# Patient Record
Sex: FEMALE | Race: WHITE | NOT HISPANIC OR LATINO | Employment: FULL TIME | URBAN - METROPOLITAN AREA
[De-identification: names, ages, dates, MRNs, and addresses within clinical notes are randomized per-mention and may not be internally consistent; named-entity substitution may affect disease eponyms.]

---

## 2024-06-19 ENCOUNTER — HOSPITAL ENCOUNTER (EMERGENCY)
Facility: HOSPITAL | Age: 49
Discharge: HOME | End: 2024-06-19
Attending: EMERGENCY MEDICINE
Payer: COMMERCIAL

## 2024-06-19 VITALS
DIASTOLIC BLOOD PRESSURE: 94 MMHG | WEIGHT: 240 LBS | RESPIRATION RATE: 16 BRPM | TEMPERATURE: 98.1 F | OXYGEN SATURATION: 96 % | BODY MASS INDEX: 37.67 KG/M2 | HEIGHT: 67 IN | HEART RATE: 61 BPM | SYSTOLIC BLOOD PRESSURE: 174 MMHG

## 2024-06-19 DIAGNOSIS — N92.1 MENORRHAGIA WITH IRREGULAR CYCLE: Primary | ICD-10-CM

## 2024-06-19 LAB
B-HCG SERPL-ACNC: <2 MIU/ML
BASOPHILS # BLD AUTO: 0.03 X10*3/UL (ref 0–0.1)
BASOPHILS NFR BLD AUTO: 0.6 %
EOSINOPHIL # BLD AUTO: 0.17 X10*3/UL (ref 0–0.7)
EOSINOPHIL NFR BLD AUTO: 3.6 %
ERYTHROCYTE [DISTWIDTH] IN BLOOD BY AUTOMATED COUNT: 13.4 % (ref 11.5–14.5)
HCT VFR BLD AUTO: 39.6 % (ref 36–46)
HGB BLD-MCNC: 13.2 G/DL (ref 12–16)
IMM GRANULOCYTES # BLD AUTO: 0.02 X10*3/UL (ref 0–0.7)
IMM GRANULOCYTES NFR BLD AUTO: 0.4 % (ref 0–0.9)
LYMPHOCYTES # BLD AUTO: 1.26 X10*3/UL (ref 1.2–4.8)
LYMPHOCYTES NFR BLD AUTO: 26.9 %
MCH RBC QN AUTO: 30.2 PG (ref 26–34)
MCHC RBC AUTO-ENTMCNC: 33.3 G/DL (ref 32–36)
MCV RBC AUTO: 91 FL (ref 80–100)
MONOCYTES # BLD AUTO: 0.55 X10*3/UL (ref 0.1–1)
MONOCYTES NFR BLD AUTO: 11.8 %
NEUTROPHILS # BLD AUTO: 2.65 X10*3/UL (ref 1.2–7.7)
NEUTROPHILS NFR BLD AUTO: 56.7 %
NRBC BLD-RTO: 0 /100 WBCS (ref 0–0)
PLATELET # BLD AUTO: 189 X10*3/UL (ref 150–450)
RBC # BLD AUTO: 4.37 X10*6/UL (ref 4–5.2)
WBC # BLD AUTO: 4.7 X10*3/UL (ref 4.4–11.3)

## 2024-06-19 PROCEDURE — 99283 EMERGENCY DEPT VISIT LOW MDM: CPT

## 2024-06-19 PROCEDURE — 36415 COLL VENOUS BLD VENIPUNCTURE: CPT | Performed by: EMERGENCY MEDICINE

## 2024-06-19 PROCEDURE — 85025 COMPLETE CBC W/AUTO DIFF WBC: CPT | Performed by: EMERGENCY MEDICINE

## 2024-06-19 PROCEDURE — 84702 CHORIONIC GONADOTROPIN TEST: CPT | Performed by: EMERGENCY MEDICINE

## 2024-06-19 RX ORDER — LOSARTAN POTASSIUM 50 MG/1
1 TABLET ORAL DAILY
COMMUNITY
Start: 2020-11-16

## 2024-06-19 RX ORDER — MELOXICAM 15 MG/1
1 TABLET ORAL DAILY
COMMUNITY

## 2024-06-19 ASSESSMENT — COLUMBIA-SUICIDE SEVERITY RATING SCALE - C-SSRS
6. HAVE YOU EVER DONE ANYTHING, STARTED TO DO ANYTHING, OR PREPARED TO DO ANYTHING TO END YOUR LIFE?: NO
2. HAVE YOU ACTUALLY HAD ANY THOUGHTS OF KILLING YOURSELF?: NO
1. IN THE PAST MONTH, HAVE YOU WISHED YOU WERE DEAD OR WISHED YOU COULD GO TO SLEEP AND NOT WAKE UP?: NO

## 2024-06-19 NOTE — ED TRIAGE NOTES
Pt arrived to ED states she is on her period but bleeding more than usual. Pt states she is passing very large clots and has went through 16 pads just today. Bleeding started yesterday.

## 2024-06-19 NOTE — ED PROVIDER NOTES
HPI   Chief Complaint   Patient presents with    Vaginal Bleeding     Pt arrived to ED states she is on her period but bleeding more than usual. Pt states she is passing very large clots and has went through 16 pads just today. Bleeding started yesterday.        Patient presents with vaginal bleeding.  She started her menstrual cycle 2 days ago.  Tonight about 5 hours prior to arrival she had heavy menstruation and was passing large blood clots.  She came here today for further evaluation.  She has occasional pelvic cramps as well.                          Pedro Luis Coma Scale Score: 15                     Patient History   Past Medical History:   Diagnosis Date    Cough, unspecified 10/12/2020    Cough in adult    Excessive and frequent menstruation with irregular cycle 11/16/2020    Menorrhagia with irregular cycle    Otitis media, unspecified, bilateral 10/12/2020    Bilateral otitis media    Personal history of other diseases of the respiratory system 10/12/2020    History of sore throat    Personal history of pneumonia (recurrent)     History of pneumonia     Past Surgical History:   Procedure Laterality Date    OTHER SURGICAL HISTORY  11/16/2020    Ovarian cystectomy     No family history on file.  Social History     Tobacco Use    Smoking status: Never    Smokeless tobacco: Never   Substance Use Topics    Alcohol use: Never    Drug use: Never       Physical Exam   ED Triage Vitals [06/19/24 0119]   Temperature Heart Rate Respirations BP   36.7 °C (98.1 °F) 61 16 (!) 174/94      Pulse Ox Temp Source Heart Rate Source Patient Position   96 % Skin -- --      BP Location FiO2 (%)     -- --       Physical Exam  Vitals and nursing note reviewed.   Constitutional:       General: She is not in acute distress.     Appearance: She is well-developed.   HENT:      Head: Normocephalic and atraumatic.   Eyes:      Conjunctiva/sclera: Conjunctivae normal.   Cardiovascular:      Rate and Rhythm: Normal rate and regular rhythm.       Heart sounds: No murmur heard.  Pulmonary:      Effort: Pulmonary effort is normal. No respiratory distress.      Breath sounds: Normal breath sounds.   Abdominal:      Palpations: Abdomen is soft.      Tenderness: There is no abdominal tenderness.   Genitourinary:     Comments: No active bleeding on pelvic exam.  Small blood clot at the cervical os.  No cervical motion tenderness.  No adnexal mass or fullness.  Musculoskeletal:         General: No swelling.      Cervical back: Neck supple.   Skin:     General: Skin is warm and dry.      Capillary Refill: Capillary refill takes less than 2 seconds.   Neurological:      Mental Status: She is alert.   Psychiatric:         Mood and Affect: Mood normal.         ED Course & MDM   Diagnoses as of 06/19/24 0348   Menorrhagia with irregular cycle       Medical Decision Making  Differential diagnosis is menorrhagia, metromenorrhagia, uterine abnormality, etc.    Patient has a normal hemoglobin.  Serum hCG is negative.  She has no active bleeding the bleeding is decreased she is instructed to call her gynecologist later on today.  Prior medical records were reviewed.        Procedure  Procedures     Elio Velazquez MD  06/19/24 [id number]     10-Caden-2021 08:26

## 2024-06-26 ENCOUNTER — HOSPITAL ENCOUNTER (OUTPATIENT)
Dept: RADIOLOGY | Facility: CLINIC | Age: 49
Discharge: HOME | End: 2024-06-26
Payer: COMMERCIAL

## 2024-06-26 ENCOUNTER — TRANSCRIBE ORDERS (OUTPATIENT)
Dept: OBSTETRICS AND GYNECOLOGY | Facility: CLINIC | Age: 49
End: 2024-06-26

## 2024-06-26 ENCOUNTER — APPOINTMENT (OUTPATIENT)
Dept: OBSTETRICS AND GYNECOLOGY | Facility: CLINIC | Age: 49
End: 2024-06-26
Payer: COMMERCIAL

## 2024-06-26 VITALS
SYSTOLIC BLOOD PRESSURE: 122 MMHG | DIASTOLIC BLOOD PRESSURE: 80 MMHG | WEIGHT: 243 LBS | HEIGHT: 67 IN | BODY MASS INDEX: 38.14 KG/M2

## 2024-06-26 DIAGNOSIS — N93.8 DUB (DYSFUNCTIONAL UTERINE BLEEDING): ICD-10-CM

## 2024-06-26 LAB — POC HEMOGLOBIN: 14 G/DL (ref 12–16)

## 2024-06-26 PROCEDURE — 1036F TOBACCO NON-USER: CPT | Performed by: OBSTETRICS & GYNECOLOGY

## 2024-06-26 PROCEDURE — 85018 HEMOGLOBIN: CPT | Performed by: OBSTETRICS & GYNECOLOGY

## 2024-06-26 PROCEDURE — 99213 OFFICE O/P EST LOW 20 MIN: CPT | Performed by: OBSTETRICS & GYNECOLOGY

## 2024-06-26 RX ORDER — NORETHINDRONE ACETATE AND ETHINYL ESTRADIOL 1.5-30(21)
1 KIT ORAL DAILY
Qty: 28 TABLET | Refills: 2 | Status: SHIPPED | OUTPATIENT
Start: 2024-06-26

## 2024-06-26 RX ORDER — LOSARTAN POTASSIUM AND HYDROCHLOROTHIAZIDE 25; 100 MG/1; MG/1
1 TABLET ORAL
COMMUNITY
Start: 2024-06-14

## 2024-06-26 ASSESSMENT — PAIN SCALES - GENERAL: PAINLEVEL: 2

## 2024-06-26 NOTE — PROGRESS NOTES
Subjective   Patient ID: Sandra Fortune is a 49 y.o. female who presents for Vaginal Bleeding (PT is here for Dub.  PT was in [facility] last Tuesday for very heavy bleeding and passing clots.  PT said she is Perimenopausal she has only had her period three times this year.).  HPI  Patient denies lightheadedness shortness of breath or symptoms of acute or chronic blood loss.  Hemoglobin level in the office today is 14.0  Review of Systems  10 systems have been reviewed and are negative and noncontributory to the patient current ailments.    Objective   Physical Exam  Vital signs reviewed    ABDOMEN- soft, non distended, bowel sound normal pitch and intensity,no palpable abnormal masses  GENITOURINARY- External genitalia: Normal.                                 - Uterus: AV/AF NSSC, mobile and nontender                                -The adnexal areas are free of tenderness or mass                                -There are no cervical lesions; there is no cervical motion tenderness                                -Vagina without lesions.  Assessment/Plan   Diagnoses and all orders for this visit:  DUB (dysfunctional uterine bleeding)  -     POCT hemoglobin manually resulted  -     US pelvis transvaginal; Future  -     norethindrone-e.estradioL-iron (Microgestin FE 1.5/30) 1.5 mg-30 mcg (21)/75 mg (7) tablet; Take 1 tablet by mouth once daily.  -.We discussed treatment options for dysfunctional uterine bleeding.  Options to include cyclic hormonal therapy, ablation, surgical intervention including D&C and hysteroscopy and ultimately hysterectomy.  For now we will cycle 3 months with a combination birth control pill and follow clinically.  Patient will return to the office if symptoms persist.         Tang Rodriguez DO 06/26/24 9:54 AM

## 2024-10-30 ENCOUNTER — APPOINTMENT (OUTPATIENT)
Dept: OBSTETRICS AND GYNECOLOGY | Facility: CLINIC | Age: 49
End: 2024-10-30
Payer: COMMERCIAL

## 2024-10-30 ENCOUNTER — HOSPITAL ENCOUNTER (OUTPATIENT)
Dept: RADIOLOGY | Facility: CLINIC | Age: 49
Discharge: HOME | End: 2024-10-30
Payer: COMMERCIAL

## 2024-10-30 VITALS
WEIGHT: 246 LBS | HEIGHT: 67 IN | SYSTOLIC BLOOD PRESSURE: 128 MMHG | BODY MASS INDEX: 38.61 KG/M2 | DIASTOLIC BLOOD PRESSURE: 82 MMHG

## 2024-10-30 DIAGNOSIS — Z86.018 HISTORY OF UTERINE FIBROID: ICD-10-CM

## 2024-10-30 DIAGNOSIS — Z12.31 BREAST CANCER SCREENING BY MAMMOGRAM: ICD-10-CM

## 2024-10-30 DIAGNOSIS — Z86.018 HISTORY OF UTERINE FIBROID: Primary | ICD-10-CM

## 2024-10-30 DIAGNOSIS — Z01.419 WELL WOMAN EXAM WITH ROUTINE GYNECOLOGICAL EXAM: ICD-10-CM

## 2024-10-30 PROCEDURE — 3008F BODY MASS INDEX DOCD: CPT | Performed by: OBSTETRICS & GYNECOLOGY

## 2024-10-30 PROCEDURE — 99396 PREV VISIT EST AGE 40-64: CPT | Performed by: OBSTETRICS & GYNECOLOGY

## 2024-10-30 PROCEDURE — 1036F TOBACCO NON-USER: CPT | Performed by: OBSTETRICS & GYNECOLOGY

## 2024-10-30 PROCEDURE — 76857 US EXAM PELVIC LIMITED: CPT | Performed by: OBSTETRICS & GYNECOLOGY

## 2024-10-30 ASSESSMENT — PAIN SCALES - GENERAL: PAINLEVEL_OUTOF10: 0-NO PAIN

## 2024-11-18 LAB
CYTOLOGY CMNT CVX/VAG CYTO-IMP: NORMAL
HPV HR 12 DNA GENITAL QL NAA+PROBE: NEGATIVE
HPV HR GENOTYPES PNL CVX NAA+PROBE: NEGATIVE
HPV16 DNA SPEC QL NAA+PROBE: NEGATIVE
HPV18 DNA SPEC QL NAA+PROBE: NEGATIVE
LAB AP HPV GENOTYPE QUESTION: YES
LAB AP HPV HR: NORMAL
LABORATORY COMMENT REPORT: NORMAL
LMP START DATE: NORMAL
MENSTRUAL HX REPORTED: NORMAL
PATH REPORT.TOTAL CANCER: NORMAL

## 2025-04-30 ENCOUNTER — APPOINTMENT (OUTPATIENT)
Dept: OBSTETRICS AND GYNECOLOGY | Facility: CLINIC | Age: 50
End: 2025-04-30
Payer: COMMERCIAL

## 2025-04-30 VITALS
HEIGHT: 67 IN | SYSTOLIC BLOOD PRESSURE: 126 MMHG | BODY MASS INDEX: 39.24 KG/M2 | DIASTOLIC BLOOD PRESSURE: 74 MMHG | WEIGHT: 250 LBS

## 2025-04-30 DIAGNOSIS — N93.8 DUB (DYSFUNCTIONAL UTERINE BLEEDING): ICD-10-CM

## 2025-04-30 PROCEDURE — 1036F TOBACCO NON-USER: CPT | Performed by: OBSTETRICS & GYNECOLOGY

## 2025-04-30 PROCEDURE — 3008F BODY MASS INDEX DOCD: CPT | Performed by: OBSTETRICS & GYNECOLOGY

## 2025-04-30 PROCEDURE — 99213 OFFICE O/P EST LOW 20 MIN: CPT | Performed by: OBSTETRICS & GYNECOLOGY

## 2025-04-30 RX ORDER — LOSARTAN POTASSIUM 100 MG/1
TABLET ORAL
COMMUNITY
Start: 2025-04-29

## 2025-04-30 RX ORDER — TRANEXAMIC ACID 650 MG/1
TABLET ORAL
Qty: 30 TABLET | Refills: 1 | Status: SHIPPED | OUTPATIENT
Start: 2025-04-30

## 2025-04-30 RX ORDER — HYDROCHLOROTHIAZIDE 25 MG/1
TABLET ORAL
COMMUNITY
Start: 2025-04-29

## 2025-04-30 ASSESSMENT — PAIN SCALES - GENERAL: PAINLEVEL_OUTOF10: 0-NO PAIN

## 2025-04-30 NOTE — PROGRESS NOTES
Subjective   Patient ID: Sandra Fortune is a 49 y.o. female who presents for Follow-up (C/o  periods still irregular/- February and April (this month heavy 1st day)/- some pain//Chaperone declined).  HPI  As contained in the chief complaint  US pelvis  Status: Final result     PACS Images     Show images for US pelvis  Signed by    Signed Time Phone Pager   Tang Rodriguez DO 10/31/2024 14:59 [phone]      Exam Information    Status Exam Begun Exam Ended   Final 10/30/2024 09:57 10/30/2024 10:08     Study Result    Narrative & Impression   Interpreted by: Tang Rodriguez  Indication  ========     Fibroid uterus  Method  ======     Transabdominal ultrasound examination,  Uterus  ======     Uterus:Leiomyoma  Uterus position:anteverted  Description of uterine malformations:none  Myometrium:normal  Endometrium:normal  Cervix details:normal  Uterus oewpcn701.7 mm  Uterus width78.6 mm  Uterus qmjmud95.7 mm  Endometrial thickness, total3.5 mm  Fibroids:Fibroids identified  Uterine fibroid D136 mm  Uterine fibroid D234 mm  Uterine fibroid D345 mm  Uterine fibroid mean38.5 mm  Uterine fibroid vol29.218 cmï¿½  Uterine fibroids findings:Anterior  Uterine fibroid D158 mm  Uterine fibroid D246 mm  Uterine fibroid D356 mm  Uterine fibroid mean53.3 mm  Uterine fibroid vol78.230 cmï¿½  Uterine fibroids findings:posterior right  Right Ovary  =========     Rt ovary:Not visualized  Left Ovary  ========     Lt ovary:Not visualized  Cul de Sac  =========     Visualized. No free fluid visualized  Impression  =========     Fibroid uterus with one lcoated in the anterior wall measuring 3.8 cms.  There is a second density located posterior and to the right which moves with the uterus during scanning. Possible pedunculated fibroid measuring 5.1cms.  Ovaries were again not visualized on today's study  Normal blood flow visualized within the adnexa  No free fluid seen in the pelvis. .       Review of Systems  10 systems have been  reviewed and are negative and noncontributory to the patient current ailments.    Objective   Physical Exam  Vital signs reviewed      Gastrointestinal: Soft, non distended, bowel sound normal pitch and intensity,no palpable abnormal masses  Genitourinary:  External genitalia: Normal.                                 - Uterus: AV/AF NSSC, mobile and nontender slightly enlarged                                -The adnexal areas are free of tenderness or mass                                                           -Inspection of Perianal Area: Normal    Assessment/Plan   Diagnoses and all orders for this visit:  DUB (dysfunctional uterine bleeding)  -     tranexamic acid (Lysteda) 650 mg tablet; 1 or 2 tablets every 8 hours for first 24 to 48 hours of period.  Heavy bleeding  - Also we discussed progesterone therapy, hysterectomy, endometrial ablation.  - Return to the office 6 months         Tang Rodriguez DO 04/30/25 10:35 AM

## 2025-05-06 ENCOUNTER — APPOINTMENT (OUTPATIENT)
Dept: RADIOLOGY | Facility: CLINIC | Age: 50
End: 2025-05-06
Payer: COMMERCIAL

## 2025-05-06 VITALS — BODY MASS INDEX: 40.18 KG/M2 | HEIGHT: 66 IN | WEIGHT: 250 LBS

## 2025-05-06 DIAGNOSIS — Z12.31 BREAST CANCER SCREENING BY MAMMOGRAM: ICD-10-CM

## 2025-05-06 PROCEDURE — 77067 SCR MAMMO BI INCL CAD: CPT

## 2025-05-06 PROCEDURE — 77067 SCR MAMMO BI INCL CAD: CPT | Mod: BILATERAL PROCEDURE | Performed by: RADIOLOGY

## 2025-05-06 PROCEDURE — 77063 BREAST TOMOSYNTHESIS BI: CPT | Mod: BILATERAL PROCEDURE | Performed by: RADIOLOGY

## 2025-05-07 DIAGNOSIS — N64.89 BREAST ASYMMETRY: Primary | ICD-10-CM

## 2025-05-07 PROBLEM — N75.1 BARTHOLIN'S GLAND ABSCESS: Status: ACTIVE | Noted: 2025-05-07

## 2025-05-07 PROBLEM — L73.9 FOLLICULITIS: Status: ACTIVE | Noted: 2025-05-07

## 2025-05-07 PROBLEM — J30.2 SEASONAL ALLERGIES: Status: ACTIVE | Noted: 2025-05-07

## 2025-05-08 ENCOUNTER — HOSPITAL ENCOUNTER (OUTPATIENT)
Dept: RADIOLOGY | Facility: CLINIC | Age: 50
Discharge: HOME | End: 2025-05-08
Payer: COMMERCIAL

## 2025-05-08 DIAGNOSIS — N63.20 MASS OF LEFT BREAST, UNSPECIFIED QUADRANT: Primary | ICD-10-CM

## 2025-05-08 DIAGNOSIS — N64.89 BREAST ASYMMETRY: ICD-10-CM

## 2025-05-08 PROCEDURE — 76982 USE 1ST TARGET LESION: CPT

## 2025-05-08 PROCEDURE — 77061 BREAST TOMOSYNTHESIS UNI: CPT | Mod: LT

## 2025-05-08 PROCEDURE — 76642 ULTRASOUND BREAST LIMITED: CPT | Mod: LT

## 2025-05-29 ENCOUNTER — OFFICE VISIT (OUTPATIENT)
Dept: ORTHOPEDIC SURGERY | Facility: CLINIC | Age: 50
End: 2025-05-29
Payer: COMMERCIAL

## 2025-05-29 ENCOUNTER — HOSPITAL ENCOUNTER (OUTPATIENT)
Dept: RADIOLOGY | Facility: CLINIC | Age: 50
Discharge: HOME | End: 2025-05-29
Payer: COMMERCIAL

## 2025-05-29 VITALS — HEIGHT: 66 IN | WEIGHT: 240 LBS | BODY MASS INDEX: 38.57 KG/M2

## 2025-05-29 DIAGNOSIS — M25.562 LEFT KNEE PAIN, UNSPECIFIED CHRONICITY: ICD-10-CM

## 2025-05-29 DIAGNOSIS — S83.242A ACUTE MEDIAL MENISCUS TEAR, LEFT, INITIAL ENCOUNTER: ICD-10-CM

## 2025-05-29 PROCEDURE — 99204 OFFICE O/P NEW MOD 45 MIN: CPT | Performed by: ORTHOPAEDIC SURGERY

## 2025-05-29 PROCEDURE — 73562 X-RAY EXAM OF KNEE 3: CPT | Mod: LEFT SIDE | Performed by: RADIOLOGY

## 2025-05-29 PROCEDURE — 1036F TOBACCO NON-USER: CPT | Performed by: ORTHOPAEDIC SURGERY

## 2025-05-29 PROCEDURE — 73562 X-RAY EXAM OF KNEE 3: CPT | Mod: LT

## 2025-05-29 PROCEDURE — 20610 DRAIN/INJ JOINT/BURSA W/O US: CPT | Performed by: ORTHOPAEDIC SURGERY

## 2025-05-29 PROCEDURE — 3008F BODY MASS INDEX DOCD: CPT | Performed by: ORTHOPAEDIC SURGERY

## 2025-05-29 RX ORDER — LIDOCAINE HYDROCHLORIDE 20 MG/ML
2 INJECTION, SOLUTION INFILTRATION; PERINEURAL
Status: COMPLETED | OUTPATIENT
Start: 2025-05-29 | End: 2025-05-29

## 2025-05-29 RX ORDER — METHYLPREDNISOLONE ACETATE 40 MG/ML
40 INJECTION, SUSPENSION INTRA-ARTICULAR; INTRALESIONAL; INTRAMUSCULAR; SOFT TISSUE
Status: COMPLETED | OUTPATIENT
Start: 2025-05-29 | End: 2025-05-29

## 2025-05-29 RX ADMIN — LIDOCAINE HYDROCHLORIDE 2 ML: 20 INJECTION, SOLUTION INFILTRATION; PERINEURAL at 16:04

## 2025-05-29 RX ADMIN — METHYLPREDNISOLONE ACETATE 40 MG: 40 INJECTION, SUSPENSION INTRA-ARTICULAR; INTRALESIONAL; INTRAMUSCULAR; SOFT TISSUE at 16:04

## 2025-05-29 ASSESSMENT — PAIN SCALES - GENERAL: PAINLEVEL_OUTOF10: 9

## 2025-05-29 ASSESSMENT — PAIN - FUNCTIONAL ASSESSMENT: PAIN_FUNCTIONAL_ASSESSMENT: 0-10

## 2025-05-29 NOTE — PROGRESS NOTES
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 activity modification and home exercise program directed by her primary care physician  No prior knee problems    See intake sheet which was reviewed and scanned in the chart    Constitutional: Well-developed well-nourished   Eyes: Sclerae anicteric, pupils equal and round  HENT: Normocephalic atraumatic  Cardiovascular: Pulses full, regular rate and rhythm  Respiratory: Breathing not labored, no wheezing  Integumentary: Skin intact, no lesions or rashes  Neurological: Sensation intact, no gross strength deficits, reflexes equal  Psychiatric: Alert oriented and appropriate  Hematologic/lymphatic: No lymphadenopathy  Left knee: No angular deformity small effusion tender medial joint line positive Morena's full range of motion no instability      X-rays are negative    Impression medial meniscus tear  Injected knee today as she is having serious discomfort at nighttime recommend MRI  Discussed arthroscopic intervention  All questions answered  L Inj/Asp: L knee on 5/29/2025 4:04 PM  Indications: pain  Details: 21 G needle, anteromedial approach  Medications: 40 mg methylPREDNISolone acetate 40 mg/mL; 2 mL lidocaine 20 mg/mL (2 %)

## 2025-06-14 ENCOUNTER — HOSPITAL ENCOUNTER (OUTPATIENT)
Dept: RADIOLOGY | Facility: HOSPITAL | Age: 50
Discharge: HOME | End: 2025-06-14
Payer: COMMERCIAL

## 2025-06-14 DIAGNOSIS — S83.242A ACUTE MEDIAL MENISCUS TEAR, LEFT, INITIAL ENCOUNTER: ICD-10-CM

## 2025-06-14 PROCEDURE — 73721 MRI JNT OF LWR EXTRE W/O DYE: CPT | Mod: LEFT SIDE | Performed by: RADIOLOGY

## 2025-06-14 PROCEDURE — 73721 MRI JNT OF LWR EXTRE W/O DYE: CPT | Mod: LT

## 2025-06-17 ENCOUNTER — TELEPHONE (OUTPATIENT)
Dept: ORTHOPEDIC SURGERY | Facility: CLINIC | Age: 50
End: 2025-06-17
Payer: COMMERCIAL

## 2025-06-17 NOTE — TELEPHONE ENCOUNTER
Patient of Dr. Ledesma had MRI done 6/14/25 and would like to know if you are able to go over her results with her and her daughter Holly.

## 2025-07-01 ENCOUNTER — APPOINTMENT (OUTPATIENT)
Dept: ORTHOPEDIC SURGERY | Facility: CLINIC | Age: 50
End: 2025-07-01
Payer: COMMERCIAL

## 2025-07-01 VITALS — WEIGHT: 240 LBS | BODY MASS INDEX: 38.74 KG/M2

## 2025-07-01 DIAGNOSIS — M23.322 MEDIAL MENISCUS, POSTERIOR HORN DERANGEMENT, LEFT: Primary | ICD-10-CM

## 2025-07-01 PROCEDURE — 99214 OFFICE O/P EST MOD 30 MIN: CPT | Performed by: ORTHOPAEDIC SURGERY

## 2025-07-01 NOTE — PROGRESS NOTES
Here discussed MRI results which show a torn medial meniscus she still having symptoms she is still limping and discomfort  Past medical,family and social histories have been reviewed and are up to date.  All other body systems have been reviewed and are negative for complaint.  Constitutional: Well-developed well-nourished   Eyes: Sclerae anicteric, pupils equal and round  HENT: Normocephalic atraumatic  Cardiovascular: Pulses full, regular rate and rhythm  Respiratory: Breathing not labored, no wheezing  Integumentary: Skin intact, no lesions or rashes  Neurological: Sensation intact, no gross strength deficits, reflexes equal  Psychiatric: Alert oriented and appropriate  Hematologic/lymphatic: No lymphadenopathy  Left knee: Small effusion range of motion  tender medially positive Morena's no instability  MRI is reviewed with patient shows a torn medial meniscus minor degenerative change assessment symptomatic medial meniscus tear has failed tincture of time nonoperative therapy recommend arthroscopic meniscectomy  Risks, benefits, goals of surgery as well as alternative treatments were discussed.   Realistic post operative expectations and expected course of recovery were reviewed.  All questions answered.

## 2025-07-09 ENCOUNTER — TELEPHONE (OUTPATIENT)
Dept: ORTHOPEDIC SURGERY | Facility: CLINIC | Age: 50
End: 2025-07-09
Payer: COMMERCIAL

## 2025-07-09 PROBLEM — M23.322 MEDIAL MENISCUS, POSTERIOR HORN DERANGEMENT, LEFT: Status: ACTIVE | Noted: 2025-07-01

## 2025-07-14 ENCOUNTER — CLINICAL SUPPORT (OUTPATIENT)
Dept: PREADMISSION TESTING | Facility: HOSPITAL | Age: 50
End: 2025-07-14
Payer: COMMERCIAL

## 2025-07-14 NOTE — CPM/PAT NURSE NOTE
CPM/PAT Nurse Note      Name: Sandra Fortune (Sandra Fortune)  /Age: 1975/50 y.o.       Medical History[1]    Surgical History[2]    Patient  reports being sexually active and has had partner(s) who are male. She reports using the following method of birth control/protection: None.    Family History[3]    Allergies[4]    Prior to Admission medications    Medication Sig Start Date End Date Taking? Authorizing Provider   hydroCHLOROthiazide (HYDRODiuril) 25 mg tablet  25   Historical Provider, MD   losartan (Cozaar) 100 mg tablet  25   Historical Provider, MD   meloxicam (Mobic) 15 mg tablet Take 1 tablet (15 mg) by mouth once daily.    Historical Provider, MD   tranexamic acid (Lysteda) 650 mg tablet 1 or 2 tablets every 8 hours for first 24 to 48 hours of period.  Heavy bleeding  Patient not taking: Reported on 2025   Tang Rodriguez,    losartan-hydrochlorothiazide (Hyzaar) 100-25 mg tablet Take 1 tablet by mouth early in the morning.. 24  Historical Provider, MD MEDHAT LANDIS     DASI Risk Score    No data to display       Caprini DVT Assessment    No data to display       Modified Frailty Index    No data to display       [id number] Stroke Risk Points  Current as of just now        N/A 0 to 9 Points:      Last Change: N/A          The [id number]-[id number] risk score (Lip DEYSI, et al. 2009. © 2010 American College [employer]) quantifies the risk of stroke for a patient with atrial fibrillation. For patients without atrial fibrillation or under the age of 18 this score appears as N/A. Higher score values generally indicate higher risk of stroke.        This score is not applicable to this patient. Components are not calculated.          Revised Cardiac Risk Index    No data to display       Apfel Simplified Score    No data to display       Risk Analysis Index Results This Encounter    No data found in the last 10 encounters.       Prodigy: High Risk  Total  Score: 0          ARISCAT Score for Postoperative Pulmonary Complications    No data to display       Law Perioperative Risk for Myocardial Infarction or Cardiac Arrest (PRESTON)    No data to display         Nurse Plan of Action: RN screening call complete.  Reviewed allergies, medications and pharmacy, medical, surgical and social history with patient.  Chart updated.  Instructed patient to stop Mobic (today) prior to surgery.                  [1]   Past Medical History:  Diagnosis Date    Arthritis     Hypertension     Obesity     Ovarian cyst     PONV (postoperative nausea and vomiting)    [2]   Past Surgical History:  Procedure Laterality Date    BREAST SURGERY Left 2009    EXCISION CYST / MASS BREAST WITH FROZEN SECTION    OVARIAN CYST REMOVAL     [3]   Family History  Problem Relation Name Age of Onset    Diabetes Mother      No Known Problems Father     [4] No Known Allergies

## 2025-07-15 ENCOUNTER — APPOINTMENT (OUTPATIENT)
Dept: LAB | Facility: HOSPITAL | Age: 50
End: 2025-07-15
Payer: COMMERCIAL

## 2025-07-15 ENCOUNTER — PRE-ADMISSION TESTING (OUTPATIENT)
Dept: PREADMISSION TESTING | Facility: HOSPITAL | Age: 50
End: 2025-07-15
Payer: COMMERCIAL

## 2025-07-15 ENCOUNTER — DOCUMENTATION (OUTPATIENT)
Dept: PREADMISSION TESTING | Facility: HOSPITAL | Age: 50
End: 2025-07-15

## 2025-07-15 VITALS
RESPIRATION RATE: 16 BRPM | HEART RATE: 63 BPM | BODY MASS INDEX: 39.68 KG/M2 | WEIGHT: 246.91 LBS | SYSTOLIC BLOOD PRESSURE: 152 MMHG | OXYGEN SATURATION: 98 % | HEIGHT: 66 IN | DIASTOLIC BLOOD PRESSURE: 80 MMHG | TEMPERATURE: 98.4 F

## 2025-07-15 DIAGNOSIS — I10 ESSENTIAL (PRIMARY) HYPERTENSION: Primary | ICD-10-CM

## 2025-07-15 DIAGNOSIS — I10 PRIMARY HYPERTENSION: Primary | ICD-10-CM

## 2025-07-15 LAB
ANION GAP SERPL CALC-SCNC: 13 MMOL/L (ref 10–20)
ATRIAL RATE: 58 BPM
BASOPHILS # BLD AUTO: 0.02 X10*3/UL (ref 0–0.1)
BASOPHILS NFR BLD AUTO: 0.4 %
BUN SERPL-MCNC: 14 MG/DL (ref 6–23)
CALCIUM SERPL-MCNC: 8.6 MG/DL (ref 8.6–10.3)
CHLORIDE SERPL-SCNC: 106 MMOL/L (ref 98–107)
CO2 SERPL-SCNC: 23 MMOL/L (ref 21–32)
CREAT SERPL-MCNC: 0.51 MG/DL (ref 0.5–1.05)
EGFRCR SERPLBLD CKD-EPI 2021: >90 ML/MIN/1.73M*2
EOSINOPHIL # BLD AUTO: 0.11 X10*3/UL (ref 0–0.7)
EOSINOPHIL NFR BLD AUTO: 2.4 %
ERYTHROCYTE [DISTWIDTH] IN BLOOD BY AUTOMATED COUNT: 12.8 % (ref 11.5–14.5)
GLUCOSE SERPL-MCNC: 88 MG/DL (ref 74–99)
HCT VFR BLD AUTO: 38.9 % (ref 36–46)
HGB BLD-MCNC: 12.8 G/DL (ref 12–16)
IMM GRANULOCYTES # BLD AUTO: 0.04 X10*3/UL (ref 0–0.7)
IMM GRANULOCYTES NFR BLD AUTO: 0.9 % (ref 0–0.9)
LYMPHOCYTES # BLD AUTO: 1.03 X10*3/UL (ref 1.2–4.8)
LYMPHOCYTES NFR BLD AUTO: 22.4 %
MCH RBC QN AUTO: 29.3 PG (ref 26–34)
MCHC RBC AUTO-ENTMCNC: 32.9 G/DL (ref 32–36)
MCV RBC AUTO: 89 FL (ref 80–100)
MONOCYTES # BLD AUTO: 0.52 X10*3/UL (ref 0.1–1)
MONOCYTES NFR BLD AUTO: 11.3 %
NEUTROPHILS # BLD AUTO: 2.88 X10*3/UL (ref 1.2–7.7)
NEUTROPHILS NFR BLD AUTO: 62.6 %
NRBC BLD-RTO: 0 /100 WBCS (ref 0–0)
P AXIS: 55 DEGREES
P OFFSET: 189 MS
P ONSET: 132 MS
PLATELET # BLD AUTO: 205 X10*3/UL (ref 150–450)
POTASSIUM SERPL-SCNC: 4.1 MMOL/L (ref 3.5–5.3)
PR INTERVAL: 170 MS
Q ONSET: 217 MS
QRS COUNT: 9 BEATS
QRS DURATION: 88 MS
QT INTERVAL: 430 MS
QTC CALCULATION(BAZETT): 422 MS
QTC FREDERICIA: 424 MS
R AXIS: 16 DEGREES
RBC # BLD AUTO: 4.37 X10*6/UL (ref 4–5.2)
SODIUM SERPL-SCNC: 138 MMOL/L (ref 136–145)
T AXIS: 34 DEGREES
T OFFSET: 432 MS
VENTRICULAR RATE: 58 BPM
WBC # BLD AUTO: 4.6 X10*3/UL (ref 4.4–11.3)

## 2025-07-15 PROCEDURE — 80048 BASIC METABOLIC PNL TOTAL CA: CPT

## 2025-07-15 PROCEDURE — 93010 ELECTROCARDIOGRAM REPORT: CPT | Performed by: INTERNAL MEDICINE

## 2025-07-15 PROCEDURE — 85025 COMPLETE CBC W/AUTO DIFF WBC: CPT

## 2025-07-15 PROCEDURE — 93005 ELECTROCARDIOGRAM TRACING: CPT | Performed by: NURSE PRACTITIONER

## 2025-07-15 ASSESSMENT — ENCOUNTER SYMPTOMS
NECK NEGATIVE: 1
ENDOCRINE NEGATIVE: 1
ARTHRALGIAS: 1
CONSTITUTIONAL NEGATIVE: 1
RESPIRATORY NEGATIVE: 1
LIMITED RANGE OF MOTION: 1
CARDIOVASCULAR NEGATIVE: 1
NEUROLOGICAL NEGATIVE: 1
GASTROINTESTINAL NEGATIVE: 1

## 2025-07-15 NOTE — CPM/PAT NURSE NOTE
CPM/PAT Nurse Note      Name: Sandra Fortune (Sandra Fortune)  /Age: 1975/50 y.o.       Medical History[1]    Surgical History[2]    Patient  reports being sexually active and has had partner(s) who are male. She reports using the following method of birth control/protection: None.    Family History[3]    Allergies[4]    Prior to Admission medications    Medication Sig Start Date End Date Taking? Authorizing Provider   hydroCHLOROthiazide (HYDRODiuril) 25 mg tablet  25   Historical Provider, MD   losartan (Cozaar) 100 mg tablet  25   Historical Provider, MD   meloxicam (Mobic) 15 mg tablet Take 1 tablet (15 mg) by mouth once daily.    Historical Provider, MD   tranexamic acid (Lysteda) 650 mg tablet 1 or 2 tablets every 8 hours for first 24 to 48 hours of period.  Heavy bleeding  Patient not taking: Reported on 7/15/2025 4/30/25   Tang Rodriguez,    losartan-hydrochlorothiazide (Hyzaar) 100-25 mg tablet Take 1 tablet by mouth early in the morning.. 24  Historical Provider, MD MEDHAT LANDIS     DASI Risk Score    No data to display       Caprini DVT Assessment    No data to display       Modified Frailty Index    No data to display       [id number] Stroke Risk Points  Current as of just now        N/A 0 to 9 Points:      Last Change: N/A          The [id number] risk score (Lip DEYSI, et al. 2009. © 2010 American College [employer] Chest [employer]) quantifies the risk of stroke for a patient with atrial fibrillation. For patients without atrial fibrillation or under the age of 18 this score appears as N/A. Higher score values generally indicate higher risk of stroke.        This score is not applicable to this patient. Components are not calculated.          Revised Cardiac Risk Index    No data to display       Apfel Simplified Score    No data to display       Risk Analysis Index Results This Encounter    No data found in the last 10 encounters.       Prodigy: High Risk  Total  Score: 0          ARISCAT Score for Postoperative Pulmonary Complications    No data to display       Law Perioperative Risk for Myocardial Infarction or Cardiac Arrest (PRESTON)    No data to display         Nurse Plan of Action: After Visit Summary (AVS) reviewed and patient verbalized good understanding of medications and NPO instructions.                   [1]   Past Medical History:  Diagnosis Date    Arthritis     Hypertension     Obesity     Ovarian cyst     PONV (postoperative nausea and vomiting)    [2]   Past Surgical History:  Procedure Laterality Date    BREAST SURGERY Left 2009    EXCISION CYST / MASS BREAST WITH FROZEN SECTION    OVARIAN CYST REMOVAL     [3]   Family History  Problem Relation Name Age of Onset    Diabetes Mother      No Known Problems Father     [4] No Known Allergies

## 2025-07-15 NOTE — CPM/PAT NURSE NOTE
CPM/PAT Nurse Note      Name: Snadra Fortune (Sandra Fortune)  /Age: 1975/50 y.o.       Medical History[1]    Surgical History[2]    Patient  reports being sexually active and has had partner(s) who are male. She reports using the following method of birth control/protection: None.    Family History[3]    Allergies[4]    Prior to Admission medications    Medication Sig Start Date End Date Taking? Authorizing Provider   hydroCHLOROthiazide (HYDRODiuril) 25 mg tablet  25   Historical Provider, MD   losartan (Cozaar) 100 mg tablet  25   Historical Provider, MD   meloxicam (Mobic) 15 mg tablet Take 1 tablet (15 mg) by mouth once daily.    Historical Provider, MD   tranexamic acid (Lysteda) 650 mg tablet 1 or 2 tablets every 8 hours for first 24 to 48 hours of period.  Heavy bleeding  Patient not taking: Reported on 7/15/2025 4/30/25   Tang Rodriguez,    losartan-hydrochlorothiazide (Hyzaar) 100-25 mg tablet Take 1 tablet by mouth early in the morning.. 24  Historical Provider, MD MEDHAT LANDIS     DASI Risk Score    No data to display       Caprini DVT Assessment    No data to display       Modified Frailty Index    No data to display       [id number] Stroke Risk Points  Current as of a minute ago        N/A 0 to 9 Points:      Last Change: N/A          The [id number] risk score (Lip DEYSI, et al. 2009. © 2010 American College [employer] Chest [employer]icians) quantifies the risk of stroke for a patient with atrial fibrillation. For patients without atrial fibrillation or under the age of 18 this score appears as N/A. Higher score values generally indicate higher risk of stroke.        This score is not applicable to this patient. Components are not calculated.          Revised Cardiac Risk Index    No data to display       Apfel Simplified Score    No data to display       Risk Analysis Index Results This Encounter    No data found in the last 10 encounters.       Prodigy: High Risk  Total  Score: 0          ARISCAT Score for Postoperative Pulmonary Complications    No data to display       Law Perioperative Risk for Myocardial Infarction or Cardiac Arrest (PRESTON)    No data to display         Nurse Plan of Action: After Visit Summary (AVS) reviewed and patient verbalized good understanding of medications and NPO instructions.                   [1]   Past Medical History:  Diagnosis Date    Arthritis     Hypertension     Obesity     Ovarian cyst     PONV (postoperative nausea and vomiting)    [2]   Past Surgical History:  Procedure Laterality Date    BREAST SURGERY Left 2009    EXCISION CYST / MASS BREAST WITH FROZEN SECTION    OVARIAN CYST REMOVAL     [3]   Family History  Problem Relation Name Age of Onset    Diabetes Mother      No Known Problems Father     [4] No Known Allergies

## 2025-07-15 NOTE — CPM/PAT NURSE NOTE
CPM/PAT Nurse Note      Name: Sandra Fortune (Sandra Fortune)  /Age: 1975/50 y.o.       Medical History[1]    Surgical History[2]    Patient  reports being sexually active and has had partner(s) who are male. She reports using the following method of birth control/protection: None.    Family History[3]    Allergies[4]    Prior to Admission medications    Medication Sig Start Date End Date Taking? Authorizing Provider   hydroCHLOROthiazide (HYDRODiuril) 25 mg tablet  25  Yes Historical Provider, MD   losartan (Cozaar) 100 mg tablet  25  Yes Historical Provider, MD   meloxicam (Mobic) 15 mg tablet Take 1 tablet (15 mg) by mouth once daily.   Yes Historical Provider, MD   tranexamic acid (Lysteda) 650 mg tablet 1 or 2 tablets every 8 hours for first 24 to 48 hours of period.  Heavy bleeding  Patient not taking: Reported on 7/15/2025 4/30/25   Tang Rodriguez,    losartan-hydrochlorothiazide (Hyzaar) 100-25 mg tablet Take 1 tablet by mouth early in the morning.. 24  Historical Provider, MD MEDHAT LANDIS     DASI Risk Score    No data to display       Caprini DVT Assessment    No data to display       Modified Frailty Index    No data to display       [id number] Stroke Risk Points  Current as of just now        N/A 0 to 9 Points:      Last Change: N/A          The [id number] risk score (Lip DEYSI, et al. 2009. © 2010 American College [employer]) quantifies the risk of stroke for a patient with atrial fibrillation. For patients without atrial fibrillation or under the age of 18 this score appears as N/A. Higher score values generally indicate higher risk of stroke.        This score is not applicable to this patient. Components are not calculated.          Revised Cardiac Risk Index    No data to display       Apfel Simplified Score    No data to display       Risk Analysis Index Results This Encounter    No data found in the last 10 encounters.       Prodigy: High Risk   Total Score: 0          ARISCAT Score for Postoperative Pulmonary Complications    No data to display       Law Perioperative Risk for Myocardial Infarction or Cardiac Arrest (PRESTON)    No data to display         Nurse Plan of Action:       After Visit Summary (AVS) reviewed and patient verbalized good understanding of medications and NPO instructions.            [1]   Past Medical History:  Diagnosis Date    Arthritis     Hypertension     Obesity     Ovarian cyst     PONV (postoperative nausea and vomiting)    [2]   Past Surgical History:  Procedure Laterality Date    BREAST SURGERY Left 2009    EXCISION CYST / MASS BREAST WITH FROZEN SECTION    OVARIAN CYST REMOVAL     [3]   Family History  Problem Relation Name Age of Onset    Diabetes Mother      No Known Problems Father     [4] No Known Allergies

## 2025-07-15 NOTE — PREPROCEDURE INSTRUCTIONS
Medication List            Accurate as of July 15, 2025  8:51 AM. Always use your most recent med list.                hydroCHLOROthiazide 25 mg tablet  Commonly known as: HYDRODiuril  Medication Adjustments for Surgery: Take/Use as prescribed     losartan 100 mg tablet  Commonly known as: Cozaar  Medication Adjustments for Surgery: Take last dose 1 day (24 hours) before surgery  Notes to patient: Do NOT take evening before surgery and do NOT take morning of surgery.     meloxicam 15 mg tablet  Commonly known as: Mobic  Additional Medication Adjustments for Surgery: Take last dose 7 days before surgery     tranexamic acid 650 mg tablet  Commonly known as: Lysteda  1 or 2 tablets every 8 hours for first 24 to 48 hours of period.  Heavy bleeding  Medication Adjustments for Surgery: Take/Use as prescribed                Preoperative Deep Breathing Exercises  Why it is important to do deep breathing exercises before my surgery?  Deep breathing exercises strengthen your breathing muscles.  This helps you to recover after your surgery and decreases the chance of breathing complications.  How are the deep breathing exercises done?  Sit straight with your back supported.  Breathe in deeply and slowly through your nose. Your lower rib cage should expand and your abdomen may move forward.  Hold that breath for 3 to 5 seconds.  Breathe out through pursed lips, slowly and completely.  Rest and repeat 10 times every hour while awake.  Rest longer if you become dizzy or lightheaded.        CONTACT SURGEON'S OFFICE IF YOU DEVELOP:  * Fever = 100.4 F   * New respiratory symptoms (e.g. cough, shortness of breath, respiratory distress, sore throat)  * Recent loss of taste or smell  *Flu like symptoms such as headache, fatigue or gastrointestinal symptoms  * You develop any open sores, shingles, burning or painful urination   AND/OR:  * You no longer wish to have the surgery.  * Any other personal circumstances change that may lead  to the need to cancel or defer this surgery.  *You were admitted to any hospital within one week of your planned procedure.    SMOKING:  *Quitting smoking can make a huge difference to your health and recovery from surgery.    *If you need help with quitting, call 7-382-QUIT-NOW.    THE DAY OF SURGERY:  *Do not eat any food after midnight the night before your surgery.   *YOU MUST drink 14 OUNCES of clear liquids TWO hours before your instructed ARRIVAL TIME to the hospital. This includes water, black tea/coffee (no milk or cream), apple juice, clear broth and electrolyte drinks (Gatorade).  Please avoid clear liquids that are red in color.   *You may chew gum/mints up to TWO hours before your surgery/procedure.    SURGICAL TIME:  *You will be contacted between 2 p.m. and 6 p.m. the business day before your surgery with your arrival time.  *If you haven't received a call by 6pm, call [phone].  *Scheduled surgery times may change and you will be notified if this occurs-check your personal voicemail for any updates.    ON THE MORNING OF SURGERY:  *Wear comfortable, loose fitting clothing.   *Do not use moisturizers, creams, lotions or perfume.  *All jewelry and valuables should be left at home.  *Prosthetic devices such as contact lenses, hearing aids, dentures, eyelash extensions, hairpins and body piercing must be removed before surgery.    BRING WITH YOU:  *Photo ID and insurance card  *Current list of medications and allergies  *Pacemaker/Defibrillator/Heart stent cards  *CPAP machine and mask  *Slings/splints/crutches  *Copy of your complete Advanced Directive/DHPOA-if applicable  *Neurostimulator implant remote    PARKING AND ARRIVAL:  *Check in at the Main Entrance desk and let them know you are here for surgery.  *You will be directed to the 2nd floor surgical waiting area.    IF YOU ARE HAVING OUTPATIENT/SAME DAY SURGERY:  *A responsible adult MUST accompany you at the time of discharge and stay with you  for 24 hours after your surgery.  *You may NOT drive yourself home after surgery.  *You may use a taxi or ride sharing service ([employer], Uber) to return home ONLY if you are accompanied by a friend or family member.  *Instructions for resuming your medications will be provided by your surgeon.

## 2025-07-15 NOTE — CPM/PAT H&P
[facility]/PAT Evaluation       Name: Sandra Fortune (Sandra Fortune)  /Age: 1975/50 y.o.     In-Person         Date of Consult: 7/15/25    Referring Provider:  Dr. Ledesma    Date, Surgery, and Length: 25, left knee arthroscopy medial meniscectomy, 90 minutes      Patient presents to [facility] for perioperative risk assessment prior to scheduled surgery. Patient presents with symptomatic torn medial meniscus.      This note was created in part upon personal review of patient's medical records.      Patient PONV  Pt denies any past history of anesthetic complications such as  awareness, prolonged sedation, dental damage, aspiration, cardiac arrest, difficult intubation, difficult I.V. access or unexpected hospital admissions. No history of malignant hyperthermia and or pseudocholinesterase deficiency.    No history of blood transfusions.    The patient IS NOT a Jain and will accept blood and blood products if medically indicated.     Type and screen NOT sent.      Past Medical History:   Diagnosis Date    Arthritis     Hypertension     Obesity     Ovarian cyst     PONV (postoperative nausea and vomiting)        Past Surgical History:   Procedure Laterality Date    BREAST SURGERY Left     EXCISION CYST / MASS BREAST WITH FROZEN SECTION    OVARIAN CYST REMOVAL       Family History   Problem Relation Name Age of Onset    Diabetes Mother      No Known Problems Father         No Known Allergies    Tobacco Use History[1]  Social History     Substance and Sexual Activity   Alcohol Use Not Currently    Alcohol/week: 0.0 - 1.0 standard drinks of alcohol     Social History     Substance and Sexual Activity   Drug Use Never         Current Outpatient Medications   Medication Instructions    hydroCHLOROthiazide (HYDRODiuril) 25 mg tablet     losartan (Cozaar) 100 mg tablet     meloxicam (Mobic) 15 mg tablet 1 tablet, Daily    tranexamic acid (Lysteda) 650 mg tablet 1 or 2 tablets every 8 hours for  "first 24 to 48 hours of period.  Heavy bleeding     PAT ROS:   Constitutional:   neg    Neuro/Psych:   neg    Eyes:    use of corrective lenses  Ears:   neg    Nose:   neg    Mouth:   neg    Throat:   neg    Neck:   neg    Cardio:   neg    Respiratory:   neg    Endocrine:   neg    GI:   neg    :   neg    Musculoskeletal:    Left knee    arthralgias   decreased ROM  Hematologic:   neg    Skin:  neg        Physical Exam  Vitals reviewed. Physical exam within normal limits.          PAT AIRWAY:   Airway:     Mallampati::  II    Neck ROM::  Full  normal        Visit Vitals  /80   Pulse 63   Temp 36.9 °C (98.4 °F)   Resp 16   Ht 1.68 m (5' 6.14\")   Wt 112 kg (246 lb 14.6 oz)   SpO2 98%   BMI 39.68 kg/m²   OB Status Perimenopausal   Smoking Status Never   BSA 2.29 m²       Assessment and Plan:       Patient is a 50 year old female scheduled for left knee arthroscopy medial meniscectomy on 7/21/25 with Dr. Ledesma.      Plan    Cardiovascular:    RCRI: 0 Risk of Mace: 0.4%    Caprini: 4 VTE risk: 1.9%    Patient denies any chest pain, tightness, heaviness, pressure, radiating pain, palpitations, irregular heartbeats, lightheadedness, cough, congestion, shortness of breath, DAVID, PND, near syncope, weight loss or gain.    Good functional capacity  Functional 4 Mets. Patient denies SOB walking up 2 flights of stairs      EKG in PAT   Encounter Date: 07/15/25   ECG 12 Lead   Result Value    Ventricular Rate 58    Atrial Rate 58    OR Interval 170    QRS Duration 88    QT Interval 430    QTC Calculation(Bazett) 422    P Axis 55    R Axis 16    T Axis 34    QRS Count 9    Q Onset 217    P Onset 132    P Offset 189    T Offset 432    QTC Fredericia 424    Narrative    Sinus bradycardia  Cannot rule out Anterior infarct , age undetermined  Abnormal ECG  No previous ECGs available  Confirmed by Kiel Reyes ([id number]) on 7/15/2025 4:43:39 PM     HTN- will hold Losartan 24 hours prior to surgery. Will continue " hydrochlorothiazide    Pulmonary:    Stop Bang score is 2 placing patient at low risk for ROEL  ARISCAT: 26-44 points, 13.3% risk of in-hospital postoperative pulmonary complication  PRODIGY: Moderate risk for opioid induced respiratory depression    Renal/endo:  Recommendations to avoid nephrotoxic drugs and carefully monitor fluid status to maintain euvolemia. Use dose adjusted medications as needed for the underlying level of renal function.    Heme:  Patient instructed to ambulate as soon as possible postoperatively to decrease thromboembolic risk.    Initiate mechanical DVT prophylaxis as soon as possible and initiate chemical prophylaxis when deemed safe from a bleeding standpoint post surgery.        Risk assessment complete.  This patient is LOW risk candidate undergoing LOW-MODERATE risk procedure, patient is medically optimized for surgery.        Labs/testing obtained in [facility] on 7/15/25: CBC, BMP, EKG    Lab Results   Component Value Date    WBC 4.6 07/15/2025    HGB 12.8 07/15/2025    HCT 38.9 07/15/2025    MCV 89 07/15/2025     07/15/2025     Lab Results   Component Value Date    GLUCOSE 88 07/15/2025    CALCIUM 8.6 07/15/2025     07/15/2025    K 4.1 07/15/2025    CO2 23 07/15/2025     07/15/2025    BUN 14 07/15/2025    CREATININE 0.51 07/15/2025         Follow up/communication: none      Preoperative medication instructions were provided and reviewed with the patient.  Any additional testing or evaluation was explained to the patient.  Nothing by mouth instructions were discussed and patient's questions were answered prior to conclusion to this encounter.  Patient verbalized understanding of preoperative instructions given in preadmission testing; discharge instructions available in EMR.    This note was dictated with speech recognition.  Minor errors may have been detected during use of speech recognition.               [1]   Social History  Tobacco Use   Smoking Status Never     Passive exposure: Never   Smokeless Tobacco Never

## 2025-07-15 NOTE — H&P (VIEW-ONLY)
[facility]/PAT Evaluation       Name: Sandra Fortune (Sandra Fortune)  /Age: 1975/50 y.o.     In-Person         Date of Consult: 7/15/25    Referring Provider:  Dr. Ledesma    Date, Surgery, and Length: 25, left knee arthroscopy medial meniscectomy, 90 minutes      Patient presents to [facility] for perioperative risk assessment prior to scheduled surgery. Patient presents with symptomatic torn medial meniscus.      This note was created in part upon personal review of patient's medical records.      Patient PONV  Pt denies any past history of anesthetic complications such as  awareness, prolonged sedation, dental damage, aspiration, cardiac arrest, difficult intubation, difficult I.V. access or unexpected hospital admissions. No history of malignant hyperthermia and or pseudocholinesterase deficiency.    No history of blood transfusions.    The patient IS NOT a Judaism and will accept blood and blood products if medically indicated.     Type and screen NOT sent.      Past Medical History:   Diagnosis Date    Arthritis     Hypertension     Obesity     Ovarian cyst     PONV (postoperative nausea and vomiting)        Past Surgical History:   Procedure Laterality Date    BREAST SURGERY Left     EXCISION CYST / MASS BREAST WITH FROZEN SECTION    OVARIAN CYST REMOVAL       Family History   Problem Relation Name Age of Onset    Diabetes Mother      No Known Problems Father         No Known Allergies    Tobacco Use History[1]  Social History     Substance and Sexual Activity   Alcohol Use Not Currently    Alcohol/week: 0.0 - 1.0 standard drinks of alcohol     Social History     Substance and Sexual Activity   Drug Use Never         Current Outpatient Medications   Medication Instructions    hydroCHLOROthiazide (HYDRODiuril) 25 mg tablet     losartan (Cozaar) 100 mg tablet     meloxicam (Mobic) 15 mg tablet 1 tablet, Daily    tranexamic acid (Lysteda) 650 mg tablet 1 or 2 tablets every 8 hours for  "first 24 to 48 hours of period.  Heavy bleeding     PAT ROS:   Constitutional:   neg    Neuro/Psych:   neg    Eyes:    use of corrective lenses  Ears:   neg    Nose:   neg    Mouth:   neg    Throat:   neg    Neck:   neg    Cardio:   neg    Respiratory:   neg    Endocrine:   neg    GI:   neg    :   neg    Musculoskeletal:    Left knee    arthralgias   decreased ROM  Hematologic:   neg    Skin:  neg        Physical Exam  Vitals reviewed. Physical exam within normal limits.          PAT AIRWAY:   Airway:     Mallampati::  II    Neck ROM::  Full  normal        Visit Vitals  /80   Pulse 63   Temp 36.9 °C (98.4 °F)   Resp 16   Ht 1.68 m (5' 6.14\")   Wt 112 kg (246 lb 14.6 oz)   SpO2 98%   BMI 39.68 kg/m²   OB Status Perimenopausal   Smoking Status Never   BSA 2.29 m²       Assessment and Plan:       Patient is a 50 year old female scheduled for left knee arthroscopy medial meniscectomy on 7/21/25 with Dr. Ledesma.      Plan    Cardiovascular:    RCRI: 0 Risk of Mace: 0.4%    Caprini: 4 VTE risk: 1.9%    Patient denies any chest pain, tightness, heaviness, pressure, radiating pain, palpitations, irregular heartbeats, lightheadedness, cough, congestion, shortness of breath, DAVID, PND, near syncope, weight loss or gain.    Good functional capacity  Functional 4 Mets. Patient denies SOB walking up 2 flights of stairs      EKG in PAT   Encounter Date: 07/15/25   ECG 12 Lead   Result Value    Ventricular Rate 58    Atrial Rate 58    [place] Interval 170    QRS Duration 88    QT Interval 430    QTC Calculation(Bazett) 422    P Axis 55    R Axis 16    T Axis 34    QRS Count 9    Q Onset 217    P Onset 132    P Offset 189    T Offset 432    QTC Fredericia 424    Narrative    Sinus bradycardia  Cannot rule out Anterior infarct , age undetermined  Abnormal ECG  No previous ECGs available  Confirmed by Kiel Reyes ([id number]) on 7/15/2025 4:43:39 PM     HTN- will hold Losartan 24 hours prior to surgery. Will continue " hydrochlorothiazide    Pulmonary:    Stop Bang score is 2 placing patient at low risk for ROEL  ARISCAT: 26-44 points, 13.3% risk of in-hospital postoperative pulmonary complication  PRODIGY: Moderate risk for opioid induced respiratory depression    Renal/endo:  Recommendations to avoid nephrotoxic drugs and carefully monitor fluid status to maintain euvolemia. Use dose adjusted medications as needed for the underlying level of renal function.    Heme:  Patient instructed to ambulate as soon as possible postoperatively to decrease thromboembolic risk.    Initiate mechanical DVT prophylaxis as soon as possible and initiate chemical prophylaxis when deemed safe from a bleeding standpoint post surgery.        Risk assessment complete.  This patient is LOW risk candidate undergoing LOW-MODERATE risk procedure, patient is medically optimized for surgery.        Labs/testing obtained in [facility] on 7/15/25: CBC, BMP, EKG    Lab Results   Component Value Date    WBC 4.6 07/15/2025    HGB 12.8 07/15/2025    HCT 38.9 07/15/2025    MCV 89 07/15/2025     07/15/2025     Lab Results   Component Value Date    GLUCOSE 88 07/15/2025    CALCIUM 8.6 07/15/2025     07/15/2025    K 4.1 07/15/2025    CO2 23 07/15/2025     07/15/2025    BUN 14 07/15/2025    CREATININE 0.51 07/15/2025         Follow up/communication: none      Preoperative medication instructions were provided and reviewed with the patient.  Any additional testing or evaluation was explained to the patient.  Nothing by mouth instructions were discussed and patient's questions were answered prior to conclusion to this encounter.  Patient verbalized understanding of preoperative instructions given in preadmission testing; discharge instructions available in EMR.    This note was dictated with speech recognition.  Minor errors may have been detected during use of speech recognition.               [1]   Social History  Tobacco Use   Smoking Status Never     Passive exposure: Never   Smokeless Tobacco Never

## 2025-07-18 ENCOUNTER — ANESTHESIA EVENT (OUTPATIENT)
Dept: OPERATING ROOM | Facility: HOSPITAL | Age: 50
End: 2025-07-18
Payer: COMMERCIAL

## 2025-07-21 ENCOUNTER — HOSPITAL ENCOUNTER (OUTPATIENT)
Facility: HOSPITAL | Age: 50
Setting detail: OUTPATIENT SURGERY
Discharge: HOME | End: 2025-07-21
Attending: ORTHOPAEDIC SURGERY | Admitting: ORTHOPAEDIC SURGERY
Payer: COMMERCIAL

## 2025-07-21 ENCOUNTER — PHARMACY VISIT (OUTPATIENT)
Dept: PHARMACY | Facility: CLINIC | Age: 50
End: 2025-07-21
Payer: COMMERCIAL

## 2025-07-21 ENCOUNTER — ANESTHESIA (OUTPATIENT)
Dept: OPERATING ROOM | Facility: HOSPITAL | Age: 50
End: 2025-07-21
Payer: COMMERCIAL

## 2025-07-21 VITALS
WEIGHT: 246.91 LBS | TEMPERATURE: 97.3 F | BODY MASS INDEX: 39.68 KG/M2 | HEIGHT: 66 IN | SYSTOLIC BLOOD PRESSURE: 138 MMHG | DIASTOLIC BLOOD PRESSURE: 85 MMHG | OXYGEN SATURATION: 97 % | RESPIRATION RATE: 16 BRPM | HEART RATE: 64 BPM

## 2025-07-21 DIAGNOSIS — M23.322 MEDIAL MENISCUS, POSTERIOR HORN DERANGEMENT, LEFT: Primary | ICD-10-CM

## 2025-07-21 LAB — PREGNANCY TEST URINE, POC: NEGATIVE

## 2025-07-21 PROCEDURE — 29881 ARTHRS KNE SRG MNISECTMY M/L: CPT | Performed by: ORTHOPAEDIC SURGERY

## 2025-07-21 PROCEDURE — 7100000009 HC PHASE TWO TIME - INITIAL BASE CHARGE: Performed by: ORTHOPAEDIC SURGERY

## 2025-07-21 PROCEDURE — 2500000001 HC RX 250 WO HCPCS SELF ADMINISTERED DRUGS (ALT 637 FOR MEDICARE OP): Performed by: ANESTHESIOLOGY

## 2025-07-21 PROCEDURE — A29881 PR KNEE SCOPE,MED/LAT MENISECTOMY: Performed by: ANESTHESIOLOGY

## 2025-07-21 PROCEDURE — 3700000002 HC GENERAL ANESTHESIA TIME - EACH INCREMENTAL 1 MINUTE: Performed by: ORTHOPAEDIC SURGERY

## 2025-07-21 PROCEDURE — A29881 PR KNEE SCOPE,MED/LAT MENISECTOMY: Performed by: NURSE ANESTHETIST, CERTIFIED REGISTERED

## 2025-07-21 PROCEDURE — 2500000004 HC RX 250 GENERAL PHARMACY W/ HCPCS (ALT 636 FOR OP/ED): Mod: JZ | Performed by: ANESTHESIOLOGY

## 2025-07-21 PROCEDURE — 81025 URINE PREGNANCY TEST: CPT | Performed by: ORTHOPAEDIC SURGERY

## 2025-07-21 PROCEDURE — 7100000010 HC PHASE TWO TIME - EACH INCREMENTAL 1 MINUTE: Performed by: ORTHOPAEDIC SURGERY

## 2025-07-21 PROCEDURE — RXMED WILLOW AMBULATORY MEDICATION CHARGE

## 2025-07-21 PROCEDURE — 2500000004 HC RX 250 GENERAL PHARMACY W/ HCPCS (ALT 636 FOR OP/ED): Performed by: NURSE ANESTHETIST, CERTIFIED REGISTERED

## 2025-07-21 PROCEDURE — 3600000009 HC OR TIME - EACH INCREMENTAL 1 MINUTE - PROCEDURE LEVEL FOUR: Performed by: ORTHOPAEDIC SURGERY

## 2025-07-21 PROCEDURE — 2720000007 HC OR 272 NO HCPCS: Performed by: ORTHOPAEDIC SURGERY

## 2025-07-21 PROCEDURE — 3700000001 HC GENERAL ANESTHESIA TIME - INITIAL BASE CHARGE: Performed by: ORTHOPAEDIC SURGERY

## 2025-07-21 PROCEDURE — 3600000004 HC OR TIME - INITIAL BASE CHARGE - PROCEDURE LEVEL FOUR: Performed by: ORTHOPAEDIC SURGERY

## 2025-07-21 PROCEDURE — 7100000002 HC RECOVERY ROOM TIME - EACH INCREMENTAL 1 MINUTE: Performed by: ORTHOPAEDIC SURGERY

## 2025-07-21 PROCEDURE — 7100000001 HC RECOVERY ROOM TIME - INITIAL BASE CHARGE: Performed by: ORTHOPAEDIC SURGERY

## 2025-07-21 PROCEDURE — 2500000004 HC RX 250 GENERAL PHARMACY W/ HCPCS (ALT 636 FOR OP/ED): Performed by: ORTHOPAEDIC SURGERY

## 2025-07-21 PROCEDURE — 2500000005 HC RX 250 GENERAL PHARMACY W/O HCPCS: Performed by: ORTHOPAEDIC SURGERY

## 2025-07-21 PROCEDURE — 2500000002 HC RX 250 W HCPCS SELF ADMINISTERED DRUGS (ALT 637 FOR MEDICARE OP, ALT 636 FOR OP/ED): Performed by: ANESTHESIOLOGY

## 2025-07-21 RX ORDER — OXYCODONE HYDROCHLORIDE 5 MG/1
5 TABLET ORAL EVERY 6 HOURS PRN
Qty: 28 TABLET | Refills: 0 | Status: SHIPPED | OUTPATIENT
Start: 2025-07-21 | End: 2025-07-28

## 2025-07-21 RX ORDER — LIDOCAINE HYDROCHLORIDE 20 MG/ML
INJECTION, SOLUTION INFILTRATION; PERINEURAL AS NEEDED
Status: DISCONTINUED | OUTPATIENT
Start: 2025-07-21 | End: 2025-07-21

## 2025-07-21 RX ORDER — ONDANSETRON HYDROCHLORIDE 2 MG/ML
INJECTION, SOLUTION INTRAVENOUS AS NEEDED
Status: DISCONTINUED | OUTPATIENT
Start: 2025-07-21 | End: 2025-07-21

## 2025-07-21 RX ORDER — ACETAMINOPHEN 325 MG/1
650 TABLET ORAL EVERY 6 HOURS PRN
Qty: 240 TABLET | Refills: 0 | Status: SHIPPED | OUTPATIENT
Start: 2025-07-21 | End: 2025-08-20

## 2025-07-21 RX ORDER — ONDANSETRON HYDROCHLORIDE 2 MG/ML
4 INJECTION, SOLUTION INTRAVENOUS ONCE AS NEEDED
Status: DISCONTINUED | OUTPATIENT
Start: 2025-07-21 | End: 2025-07-21 | Stop reason: HOSPADM

## 2025-07-21 RX ORDER — SODIUM CHLORIDE, SODIUM LACTATE, POTASSIUM CHLORIDE, CALCIUM CHLORIDE 600; 310; 30; 20 MG/100ML; MG/100ML; MG/100ML; MG/100ML
INJECTION, SOLUTION INTRAVENOUS CONTINUOUS PRN
Status: DISCONTINUED | OUTPATIENT
Start: 2025-07-21 | End: 2025-07-21

## 2025-07-21 RX ORDER — LABETALOL HYDROCHLORIDE 5 MG/ML
5 INJECTION, SOLUTION INTRAVENOUS ONCE AS NEEDED
Status: DISCONTINUED | OUTPATIENT
Start: 2025-07-21 | End: 2025-07-21 | Stop reason: HOSPADM

## 2025-07-21 RX ORDER — OXYCODONE HYDROCHLORIDE 5 MG/1
5 TABLET ORAL EVERY 4 HOURS PRN
Status: DISCONTINUED | OUTPATIENT
Start: 2025-07-21 | End: 2025-07-21 | Stop reason: HOSPADM

## 2025-07-21 RX ORDER — FENTANYL CITRATE 50 UG/ML
INJECTION, SOLUTION INTRAMUSCULAR; INTRAVENOUS AS NEEDED
Status: DISCONTINUED | OUTPATIENT
Start: 2025-07-21 | End: 2025-07-21

## 2025-07-21 RX ORDER — ONDANSETRON HYDROCHLORIDE 2 MG/ML
4 INJECTION, SOLUTION INTRAVENOUS ONCE
Status: COMPLETED | OUTPATIENT
Start: 2025-07-21 | End: 2025-07-21

## 2025-07-21 RX ORDER — CEFAZOLIN 1 G/1
INJECTION, POWDER, FOR SOLUTION INTRAVENOUS AS NEEDED
Status: DISCONTINUED | OUTPATIENT
Start: 2025-07-21 | End: 2025-07-21

## 2025-07-21 RX ORDER — ONDANSETRON 4 MG/1
8 TABLET, ORALLY DISINTEGRATING ORAL ONCE
Status: COMPLETED | OUTPATIENT
Start: 2025-07-21 | End: 2025-07-21

## 2025-07-21 RX ORDER — ASPIRIN 81 MG/1
81 TABLET ORAL 2 TIMES DAILY
Qty: 56 TABLET | Refills: 0 | Status: SHIPPED | OUTPATIENT
Start: 2025-07-21 | End: 2025-08-18

## 2025-07-21 RX ORDER — DOCUSATE SODIUM 100 MG/1
100 CAPSULE, LIQUID FILLED ORAL 2 TIMES DAILY
Qty: 60 CAPSULE | Refills: 0 | Status: SHIPPED | OUTPATIENT
Start: 2025-07-21 | End: 2025-08-20

## 2025-07-21 RX ORDER — SODIUM CHLORIDE 0.9 G/100ML
INJECTION, SOLUTION IRRIGATION AS NEEDED
Status: DISCONTINUED | OUTPATIENT
Start: 2025-07-21 | End: 2025-07-21 | Stop reason: HOSPADM

## 2025-07-21 RX ORDER — APREPITANT 40 MG/1
CAPSULE ORAL AS NEEDED
Status: DISCONTINUED | OUTPATIENT
Start: 2025-07-21 | End: 2025-07-21

## 2025-07-21 RX ORDER — PROPOFOL 10 MG/ML
INJECTION, EMULSION INTRAVENOUS AS NEEDED
Status: DISCONTINUED | OUTPATIENT
Start: 2025-07-21 | End: 2025-07-21

## 2025-07-21 RX ORDER — MIDAZOLAM HYDROCHLORIDE 2 MG/2ML
INJECTION, SOLUTION INTRAMUSCULAR; INTRAVENOUS AS NEEDED
Status: DISCONTINUED | OUTPATIENT
Start: 2025-07-21 | End: 2025-07-21

## 2025-07-21 RX ORDER — LIDOCAINE HYDROCHLORIDE 10 MG/ML
0.1 INJECTION, SOLUTION EPIDURAL; INFILTRATION; INTRACAUDAL; PERINEURAL ONCE
Status: DISCONTINUED | OUTPATIENT
Start: 2025-07-21 | End: 2025-07-21 | Stop reason: HOSPADM

## 2025-07-21 RX ORDER — HYDRALAZINE HYDROCHLORIDE 20 MG/ML
5 INJECTION INTRAMUSCULAR; INTRAVENOUS EVERY 30 MIN PRN
Status: DISCONTINUED | OUTPATIENT
Start: 2025-07-21 | End: 2025-07-21 | Stop reason: HOSPADM

## 2025-07-21 RX ORDER — ALBUTEROL SULFATE 0.83 MG/ML
2.5 SOLUTION RESPIRATORY (INHALATION) ONCE AS NEEDED
Status: DISCONTINUED | OUTPATIENT
Start: 2025-07-21 | End: 2025-07-21 | Stop reason: HOSPADM

## 2025-07-21 RX ORDER — SODIUM CHLORIDE, SODIUM LACTATE, POTASSIUM CHLORIDE, CALCIUM CHLORIDE 600; 310; 30; 20 MG/100ML; MG/100ML; MG/100ML; MG/100ML
100 INJECTION, SOLUTION INTRAVENOUS CONTINUOUS
Status: DISCONTINUED | OUTPATIENT
Start: 2025-07-21 | End: 2025-07-21 | Stop reason: HOSPADM

## 2025-07-21 RX ADMIN — ONDANSETRON 4 MG: 2 INJECTION INTRAMUSCULAR; INTRAVENOUS at 10:27

## 2025-07-21 RX ADMIN — LIDOCAINE HYDROCHLORIDE 60 MG: 20 INJECTION, SOLUTION INFILTRATION; PERINEURAL at 09:48

## 2025-07-21 RX ADMIN — OXYCODONE HYDROCHLORIDE 5 MG: 5 TABLET ORAL at 11:43

## 2025-07-21 RX ADMIN — MIDAZOLAM HYDROCHLORIDE 2 MG: 1 INJECTION, SOLUTION INTRAMUSCULAR; INTRAVENOUS at 09:44

## 2025-07-21 RX ADMIN — FENTANYL CITRATE 50 MCG: 50 INJECTION, SOLUTION INTRAMUSCULAR; INTRAVENOUS at 10:27

## 2025-07-21 RX ADMIN — DEXAMETHASONE SODIUM PHOSPHATE 4 MG: 4 INJECTION, SOLUTION INTRAMUSCULAR; INTRAVENOUS at 09:50

## 2025-07-21 RX ADMIN — FENTANYL CITRATE 50 MCG: 50 INJECTION, SOLUTION INTRAMUSCULAR; INTRAVENOUS at 09:49

## 2025-07-21 RX ADMIN — SODIUM CHLORIDE, POTASSIUM CHLORIDE, SODIUM LACTATE AND CALCIUM CHLORIDE: 600; 310; 30; 20 INJECTION, SOLUTION INTRAVENOUS at 09:45

## 2025-07-21 RX ADMIN — FENTANYL CITRATE 50 MCG: 50 INJECTION, SOLUTION INTRAMUSCULAR; INTRAVENOUS at 10:22

## 2025-07-21 RX ADMIN — FENTANYL CITRATE 50 MCG: 50 INJECTION, SOLUTION INTRAMUSCULAR; INTRAVENOUS at 09:45

## 2025-07-21 RX ADMIN — CEFAZOLIN 2 G: 1 INJECTION, POWDER, FOR SOLUTION INTRAMUSCULAR; INTRAVENOUS at 09:50

## 2025-07-21 RX ADMIN — PROPOFOL 150 MG: 10 INJECTION, EMULSION INTRAVENOUS at 09:48

## 2025-07-21 RX ADMIN — APREPITANT 40 MG: 40 CAPSULE ORAL at 08:40

## 2025-07-21 RX ADMIN — PROPOFOL 30 MG: 10 INJECTION, EMULSION INTRAVENOUS at 10:22

## 2025-07-21 RX ADMIN — ONDANSETRON 8 MG: 4 TABLET, ORALLY DISINTEGRATING ORAL at 12:36

## 2025-07-21 RX ADMIN — PROPOFOL 20 MG: 10 INJECTION, EMULSION INTRAVENOUS at 10:27

## 2025-07-21 RX ADMIN — HYDROMORPHONE HYDROCHLORIDE 0.5 MG: 1 INJECTION, SOLUTION INTRAMUSCULAR; INTRAVENOUS; SUBCUTANEOUS at 11:09

## 2025-07-21 ASSESSMENT — PAIN SCALES - GENERAL
PAINLEVEL_OUTOF10: 6
PAINLEVEL_OUTOF10: 0 - NO PAIN
PAINLEVEL_OUTOF10: 5 - MODERATE PAIN
PAINLEVEL_OUTOF10: 3
PAINLEVEL_OUTOF10: 7
PAINLEVEL_OUTOF10: 5 - MODERATE PAIN
PAINLEVEL_OUTOF10: 0 - NO PAIN
PAINLEVEL_OUTOF10: 0 - NO PAIN
PAINLEVEL_OUTOF10: 7
PAINLEVEL_OUTOF10: 7
PAINLEVEL_OUTOF10: 8

## 2025-07-21 ASSESSMENT — PAIN - FUNCTIONAL ASSESSMENT
PAIN_FUNCTIONAL_ASSESSMENT: UNABLE TO SELF-REPORT
PAIN_FUNCTIONAL_ASSESSMENT: 0-10

## 2025-07-21 ASSESSMENT — COLUMBIA-SUICIDE SEVERITY RATING SCALE - C-SSRS
1. IN THE PAST MONTH, HAVE YOU WISHED YOU WERE DEAD OR WISHED YOU COULD GO TO SLEEP AND NOT WAKE UP?: NO
6. HAVE YOU EVER DONE ANYTHING, STARTED TO DO ANYTHING, OR PREPARED TO DO ANYTHING TO END YOUR LIFE?: NO
2. HAVE YOU ACTUALLY HAD ANY THOUGHTS OF KILLING YOURSELF?: NO

## 2025-07-21 NOTE — ANESTHESIA PREPROCEDURE EVALUATION
Patient: Sandra Fortune    Procedure Information       Date/Time: 07/21/25 0850    Procedure: Left Knee Arthroscopy; Medial Meniscectomy (Left: Knee)    Location: [facility] A OR 17 / Virtual [facility] A OR    Surgeons: Ismael Ledesma MD          49 yo hx BMI 39, PONV, neg HCG , HTN    Relevant Problems   Cardiac   (+) Hypertension      HEENT   (+) Seasonal allergies      ID   (+) Bartholin's gland abscess      GYN   (+) Menorrhagia       Clinical information reviewed:   Tobacco  Allergies  Meds   Med Hx  Surg Hx   Fam Hx  Soc Hx        NPO Detail:  NPO/Void Status  Date of Last Liquid: 07/21/25  Time of Last Liquid: 0500  Date of Last Solid: 07/20/25  Time of Last Solid: 2300  Last Intake Type: Clear fluids  Time of Last Void: 0710         Physical Exam    Airway  Mallampati: III  TM distance: >3 FB  Neck ROM: full  Mouth opening: 3 or more finger widths     Cardiovascular Rate: normal     Dental - normal exam     Pulmonary - normal exam   Abdominal            Anesthesia Plan    History of general anesthesia?: yes  History of complications of general anesthesia?: no    ASA 2     general     intravenous induction   Postoperative pain plan includes opioids.  Anesthetic plan and risks discussed with patient.

## 2025-07-21 NOTE — BRIEF OP NOTE
Date: 2025  OR Location: [facility] OR    Name: Sandra Fortune, : 1975, Age: 50 y.o., MRN: [id number], Sex: female    Diagnosis  Pre-op Diagnosis      * Medial meniscus, posterior horn derangement, left [M23.322] Post-op Diagnosis     * Medial meniscus, posterior horn derangement, left [M23.322]     Procedures  Left Knee Arthroscopy; Medial Meniscectomy  41542 - MD ARTHRS KNE SURG W/MENISCECTOMY MED/LAT W/SHVG      Surgeons      * Ismael Ledesma - Primary    Resident/Fellow/Other Assistant:  Surgeons and Role:     * Rangel Rockwell MD - Assisting    Staff:   Circulator: Maritza Dickerson Person: Masoud    Anesthesia Staff: Anesthesiologist: Petar Mclaughlin MD  CRNA: SOPHIA Forman-CRNA    Procedure Summary  Anesthesia: General  ASA: II  Estimated Blood Loss: <5mL  Intra-op Medications:   Administrations occurring from 0850 to 1020 on 25:   Medication Name Total Dose   ceFAZolin (Ancef) vial 1 g 2 g   dexAMETHasone (Decadron) 4 mg/mL IV Syringe 2 mL 4 mg   fentaNYL (Sublimaze) injection 50 mcg/mL 100 mcg   lactated Ringer's infusion Cannot be calculated   lidocaine (Xylocaine) injection 2 % 60 mg   midazolam PF (Versed) injection 1 mg/mL 2 mg   propofol (Diprivan) injection 10 mg/mL 150 mg              Anesthesia Record               Intraprocedure I/O Totals          Intake    lactated Ringer's 700.00 mL    Total Intake 700 mL       Output    Urine 0 mL    Est. Blood Loss 1 mL    Total Output 1 mL       Net    Net Volume 699 mL          Specimen: No specimens collected               Findings: left knee medial meniscus tear    Complications:  None; patient tolerated the procedure well.     Disposition: PACU - hemodynamically stable.  Condition: stable  Specimens Collected: No specimens collected  Attending Attestation:     Ismael Ledesma  Phone Number: [phone]

## 2025-07-21 NOTE — ANESTHESIA POSTPROCEDURE EVALUATION
Patient: Sandra Fortune    Procedure Summary       Date: 07/21/25 Room / Location: [facility] OR 17 / Virtual [facility] OR    Anesthesia Start: 0945 Anesthesia Stop: 1047    Procedure: Left Knee Arthroscopy; Medial Meniscectomy (Left: Knee) Diagnosis:       Medial meniscus, posterior horn derangement, left      (Medial meniscus, posterior horn derangement, left [M23.322])    Surgeons: Ismael Ledesma MD Responsible Provider: Petar Mclaughlin MD    Anesthesia Type: general ASA Status: 2            Anesthesia Type: general    Vitals Value Taken Time   /89 07/21/25 12:00   Temp 36.3 °C (97.3 °F) 07/21/25 11:15   Pulse 60 07/21/25 12:00   Resp 15 07/21/25 12:00   SpO2 98 % 07/21/25 12:00       Anesthesia Post Evaluation    Patient participation: complete - patient participated  Level of consciousness: awake  Pain management: satisfactory to patient  Airway patency: patent  Cardiovascular status: acceptable and hemodynamically stable  Respiratory status: acceptable and nonlabored ventilation  Hydration status: balanced  Postoperative Nausea and Vomiting: none        No notable events documented.

## 2025-07-21 NOTE — OP NOTE
Left Knee Arthroscopy; Medial Meniscectomy (L) Operative Note     Date: 2025  OR Location: [facility] A OR    Name: Sandra Fortune : 1975, Age: 50 y.o., MRN: [id number], Sex: female    Diagnosis  Pre-op Diagnosis      * Medial meniscus, posterior horn derangement, left [M23.322] Post-op Diagnosis     * Medial meniscus, posterior horn derangement, left [M23.322]     Procedures  Left Knee Arthroscopy; Medial Meniscectomy  45270 - NE ARTHRS KNE SURG W/MENISCECTOMY MED/LAT W/SHVG      Surgeons      * Ismael Ledesma - Primary    Resident/Fellow/Other Assistant:  Surgeons and Role:     * Rangel Rockwell MD - Assisting    Staff:   Codiulator: Maritza Dickerson Person: Masoud    Anesthesia Staff: Anesthesiologist: Petar Mclaughlin MD  CRNA: SOPHIA Forman-CRNA    Procedure Summary  Anesthesia: General  ASA: II  Estimated Blood Loss: 0mL  Intra-op Medications:   Administrations occurring from 0850 to 1020 on 25:   Medication Name Total Dose   ceFAZolin (Ancef) vial 1 g 2 g   dexAMETHasone (Decadron) 4 mg/mL IV Syringe 2 mL 4 mg   fentaNYL (Sublimaze) injection 50 mcg/mL 100 mcg   lactated Ringer's infusion Cannot be calculated   lidocaine (Xylocaine) injection 2 % 60 mg   midazolam PF (Versed) injection 1 mg/mL 2 mg   propofol (Diprivan) injection 10 mg/mL 150 mg              Anesthesia Record               Intraprocedure I/O Totals          Intake    lactated Ringer's 700.00 mL    Total Intake 700 mL       Output    Urine 0 mL    Est. Blood Loss 1 mL    Total Output 1 mL       Net    Net Volume 699 mL          Specimen: No specimens collected              Drains and/or Catheters: * None in log *    Tourniquet Times:     Total Tourniquet Time Documented:  Thigh (Left) - 42 minutes  Total: Thigh (Left) - 42 minutes      Implants:     Findings: mmt    Indications: Sandra Fortune is an 50 y.o. female who is having surgery for Medial meniscus, posterior horn derangement, left [M23.322].     The  patient was seen in the preoperative area. The risks, benefits, complications, treatment options, non-operative alternatives, expected recovery and outcomes were discussed with the patient. The possibilities of reaction to medication, pulmonary aspiration, injury to surrounding structures, bleeding, recurrent infection, the need for additional procedures, failure to diagnose a condition, and creating a complication requiring transfusion or operation were discussed with the patient. The patient concurred with the proposed plan, giving informed consent.  The site of surgery was properly noted/marked if necessary per policy. The patient has been actively warmed in preoperative area. Preoperative antibiotics have been ordered and given within 1 hours of incision. Venous thrombosis prophylaxis have been ordered including unilateral sequential compression device    Procedure Details: Preoperative huddle was performed with patient indication procedure and site verification.  Patient given prophylactic antibiotics.  The left leg was sterilely prepped and draped in usual fashion.  A thigh tourniquet inflated after exsanguination Esmarch bandage ultimately for arthroscopy portals were established 3 medial and 1 lateral inspection lateral joint revealed some central fraying lateral meniscus and some minor chondral changes of the articular surface of the femur there is no displaceable meniscal tear.  Inspection of the notch revealed intact ACL  Inspection patellofemoral area revealed no high-grade chondromalacia.  No loose bodies.  Inspection medial joint revealed some partial-thickness changes of the medial femoral condyle grade 2 scattered additionally there was a multiplanar tear of the posterior and medial meniscus was excised back to a balanced stable rim approximately 40% of posterior and was sacrificed.  After conclusion of the procedure the arthroscopic equipment was removed.  Knee was locally injected with a  combination of lidocaine and Duramorph.  Portals were closed using interrupted nonabsorbable sutures.  Sterile dressing applied.  Evidence of Infection: No   Complications:  None; patient tolerated the procedure well.    Disposition: PACU - hemodynamically stable.  Condition: stable                 Additional Details:     Attending Attestation: I was present for the entire procedure.    Ismael Ledesma  Phone Number: [phone]

## 2025-07-21 NOTE — ANESTHESIA PROCEDURE NOTES
Airway  Date/Time: 7/21/2025 9:55 AM  Reason: elective    Airway not difficult    Staffing  Performed: CRNA   Authorized by: Petar Mclaughlin MD    Performed by: SOPHIA Forman-KORIN  Patient location during procedure: OR    Patient Condition  Indications for airway management: anesthesia  Patient position: sniffing  MILS maintained throughout  Sedation level: minimal     Final Airway Details   Preoxygenated: yes  Final airway type: supraglottic airway  Successful airway: Supraglottic airway: I GEL.  Size: 4  Number of attempts at approach: 1  Number of other approaches attempted: 0    Additional Comments  Small mouth short chin  full mouth opening   mask baggable  LMA 4 IGEL easy

## 2025-07-21 NOTE — PERIOPERATIVE NURSING NOTE
1128: Handoff received from Mai MCNAIR  1130: Pt tolerating pudding and water at this time  1143: 5mg oxycodone given per emr order  1145: Meds to beds notified that pt needs meds delivered prior to discharge  1202: Meds to beds bedside speaking to pt  1215: Phase 2 care begins  1236: 8mg Zofran ODT given per Dr. Mclaughlin order for nausea in phase 2  1242: Handoff given to Fco MCNAIR

## 2025-07-21 NOTE — DISCHARGE INSTRUCTIONS
Additional instructions  Ice/Elevate operative extremity.  Keep dressing clean and dry.    Keep dressing in place.  Weightbearing: WBAT with knee immobilizer in place on operative extremity with crutches/walker. Ok to remove knee immobilizer to work on range of motion 3 times per day  Oxycodone 1 by mouth every 6 hours as needed for pain.  Start Tylenol 650 mg by mouth every 6 hours as needed for pain.  Aspirin 81 mg by mouth twice daily.  F/U with Dr. Ledesma in 2 weeks.  Call [phone] for appointment time.

## 2025-08-05 ENCOUNTER — APPOINTMENT (OUTPATIENT)
Dept: ORTHOPEDIC SURGERY | Facility: CLINIC | Age: 50
End: 2025-08-05
Payer: COMMERCIAL

## 2025-08-05 DIAGNOSIS — M23.322 MEDIAL MENISCUS, POSTERIOR HORN DERANGEMENT, LEFT: Primary | ICD-10-CM

## 2025-08-05 PROCEDURE — 1036F TOBACCO NON-USER: CPT | Performed by: ORTHOPAEDIC SURGERY

## 2025-08-05 PROCEDURE — 99024 POSTOP FOLLOW-UP VISIT: CPT | Performed by: ORTHOPAEDIC SURGERY

## 2025-08-05 NOTE — PROGRESS NOTES
Follow-up knee arthroscopy described operative findings she has a small effusion no valgus laxity no calf tenderness discussed a home exercise program progression activity follow-up as needed

## (undated) DEVICE — TUBING, PUMP REDEUCE 8FT STERILE

## (undated) DEVICE — EXCAL 4MM X 13CM SINGLE

## (undated) DEVICE — TOWEL, SURGICAL, NEURO, O/R, 16 X 26, BLUE, STERILE

## (undated) DEVICE — TUBING, PATIENT 8FT STERILE

## (undated) DEVICE — GLOVE, SURGICAL, PROTEXIS PI ORTHO, 8.0, PF, LF

## (undated) DEVICE — GLOVE, SURGICAL, PROTEXIS PI MICRO, 8.0, PF, LF

## (undated) DEVICE — BANDAGE, ESMARK, 6 IN X 9 FT, STERILE

## (undated) DEVICE — TUBING, SUCTION, NON-CONDUCTIVE, W/CONNECT,.25 IN X 12 FT, STERILE, LF

## (undated) DEVICE — APPLICATOR, CHLORAPREP, W/ORANGE TINT, 26ML

## (undated) DEVICE — IMMOBILIZER, KNEE, POST OP, SUPER LITE, W/STRAIGHT STAYS, LARGE, 20 IN

## (undated) DEVICE — SUTURE, ETHILON, 2-0, 18 IN, FS, BLACK, BX/36

## (undated) DEVICE — MAT, FLOOR, SURGISAFE, FLUID CONTROL, 46X40

## (undated) DEVICE — SUTURE, ETHILON, 4-0, BLK, MONO, PS-2 18

## (undated) DEVICE — Device

## (undated) DEVICE — SYRINGE, 10 CC, LUER LOCK

## (undated) DEVICE — CUFF, TOURNIQUET, 30 X 4, DUAL PORT/SNGL BLADDER, DISP, LF